# Patient Record
Sex: MALE | Race: WHITE | NOT HISPANIC OR LATINO | URBAN - METROPOLITAN AREA
[De-identification: names, ages, dates, MRNs, and addresses within clinical notes are randomized per-mention and may not be internally consistent; named-entity substitution may affect disease eponyms.]

---

## 2023-03-20 ENCOUNTER — INPATIENT (INPATIENT)
Facility: HOSPITAL | Age: 43
LOS: 1 days | Discharge: HOME | DRG: 392 | End: 2023-03-22
Attending: STUDENT IN AN ORGANIZED HEALTH CARE EDUCATION/TRAINING PROGRAM | Admitting: SURGERY
Payer: COMMERCIAL

## 2023-03-20 VITALS
TEMPERATURE: 99 F | RESPIRATION RATE: 18 BRPM | HEART RATE: 96 BPM | WEIGHT: 179.9 LBS | SYSTOLIC BLOOD PRESSURE: 152 MMHG | DIASTOLIC BLOOD PRESSURE: 79 MMHG | OXYGEN SATURATION: 96 %

## 2023-03-20 DIAGNOSIS — Z98.890 OTHER SPECIFIED POSTPROCEDURAL STATES: Chronic | ICD-10-CM

## 2023-03-20 LAB
ALBUMIN SERPL ELPH-MCNC: 4 G/DL — SIGNIFICANT CHANGE UP (ref 3.3–5)
ALP SERPL-CCNC: 81 U/L — SIGNIFICANT CHANGE UP (ref 40–120)
ALT FLD-CCNC: 22 U/L — SIGNIFICANT CHANGE UP (ref 10–45)
ANION GAP SERPL CALC-SCNC: 13 MMOL/L — SIGNIFICANT CHANGE UP (ref 5–17)
APPEARANCE UR: CLEAR — SIGNIFICANT CHANGE UP
AST SERPL-CCNC: 17 U/L — SIGNIFICANT CHANGE UP (ref 10–40)
BASOPHILS # BLD AUTO: 0.08 K/UL — SIGNIFICANT CHANGE UP (ref 0–0.2)
BASOPHILS NFR BLD AUTO: 0.7 % — SIGNIFICANT CHANGE UP (ref 0–2)
BILIRUB SERPL-MCNC: 0.4 MG/DL — SIGNIFICANT CHANGE UP (ref 0.2–1.2)
BILIRUB UR-MCNC: NEGATIVE — SIGNIFICANT CHANGE UP
BUN SERPL-MCNC: 15 MG/DL — SIGNIFICANT CHANGE UP (ref 7–23)
CALCIUM SERPL-MCNC: 9.9 MG/DL — SIGNIFICANT CHANGE UP (ref 8.4–10.5)
CHLORIDE SERPL-SCNC: 100 MMOL/L — SIGNIFICANT CHANGE UP (ref 96–108)
CO2 SERPL-SCNC: 23 MMOL/L — SIGNIFICANT CHANGE UP (ref 22–31)
COLOR SPEC: YELLOW — SIGNIFICANT CHANGE UP
CREAT SERPL-MCNC: 0.84 MG/DL — SIGNIFICANT CHANGE UP (ref 0.5–1.3)
DIFF PNL FLD: NEGATIVE — SIGNIFICANT CHANGE UP
EGFR: 112 ML/MIN/1.73M2 — SIGNIFICANT CHANGE UP
EOSINOPHIL # BLD AUTO: 0.08 K/UL — SIGNIFICANT CHANGE UP (ref 0–0.5)
EOSINOPHIL NFR BLD AUTO: 0.7 % — SIGNIFICANT CHANGE UP (ref 0–6)
GLUCOSE SERPL-MCNC: 107 MG/DL — HIGH (ref 70–99)
GLUCOSE UR QL: NEGATIVE — SIGNIFICANT CHANGE UP
HCT VFR BLD CALC: 41.6 % — SIGNIFICANT CHANGE UP (ref 39–50)
HGB BLD-MCNC: 14.3 G/DL — SIGNIFICANT CHANGE UP (ref 13–17)
IMM GRANULOCYTES NFR BLD AUTO: 0.3 % — SIGNIFICANT CHANGE UP (ref 0–0.9)
KETONES UR-MCNC: NEGATIVE — SIGNIFICANT CHANGE UP
LEUKOCYTE ESTERASE UR-ACNC: NEGATIVE — SIGNIFICANT CHANGE UP
LIDOCAIN IGE QN: 28 U/L — SIGNIFICANT CHANGE UP (ref 7–60)
LYMPHOCYTES # BLD AUTO: 1.86 K/UL — SIGNIFICANT CHANGE UP (ref 1–3.3)
LYMPHOCYTES # BLD AUTO: 16.8 % — SIGNIFICANT CHANGE UP (ref 13–44)
MCHC RBC-ENTMCNC: 32.1 PG — SIGNIFICANT CHANGE UP (ref 27–34)
MCHC RBC-ENTMCNC: 34.4 GM/DL — SIGNIFICANT CHANGE UP (ref 32–36)
MCV RBC AUTO: 93.3 FL — SIGNIFICANT CHANGE UP (ref 80–100)
MONOCYTES # BLD AUTO: 1.04 K/UL — HIGH (ref 0–0.9)
MONOCYTES NFR BLD AUTO: 9.4 % — SIGNIFICANT CHANGE UP (ref 2–14)
NEUTROPHILS # BLD AUTO: 7.97 K/UL — HIGH (ref 1.8–7.4)
NEUTROPHILS NFR BLD AUTO: 72.1 % — SIGNIFICANT CHANGE UP (ref 43–77)
NITRITE UR-MCNC: NEGATIVE — SIGNIFICANT CHANGE UP
NRBC # BLD: 0 /100 WBCS — SIGNIFICANT CHANGE UP (ref 0–0)
PH UR: 6 — SIGNIFICANT CHANGE UP (ref 5–8)
PLATELET # BLD AUTO: 294 K/UL — SIGNIFICANT CHANGE UP (ref 150–400)
POTASSIUM SERPL-MCNC: 4.2 MMOL/L — SIGNIFICANT CHANGE UP (ref 3.5–5.3)
POTASSIUM SERPL-SCNC: 4.2 MMOL/L — SIGNIFICANT CHANGE UP (ref 3.5–5.3)
PROT SERPL-MCNC: 7.5 G/DL — SIGNIFICANT CHANGE UP (ref 6–8.3)
PROT UR-MCNC: NEGATIVE MG/DL — SIGNIFICANT CHANGE UP
RBC # BLD: 4.46 M/UL — SIGNIFICANT CHANGE UP (ref 4.2–5.8)
RBC # FLD: 11.8 % — SIGNIFICANT CHANGE UP (ref 10.3–14.5)
SARS-COV-2 RNA SPEC QL NAA+PROBE: NEGATIVE — SIGNIFICANT CHANGE UP
SODIUM SERPL-SCNC: 136 MMOL/L — SIGNIFICANT CHANGE UP (ref 135–145)
SP GR SPEC: 1.02 — SIGNIFICANT CHANGE UP (ref 1–1.03)
UROBILINOGEN FLD QL: 0.2 E.U./DL — SIGNIFICANT CHANGE UP
WBC # BLD: 11.06 K/UL — HIGH (ref 3.8–10.5)
WBC # FLD AUTO: 11.06 K/UL — HIGH (ref 3.8–10.5)

## 2023-03-20 PROCEDURE — G1004: CPT

## 2023-03-20 PROCEDURE — 99285 EMERGENCY DEPT VISIT HI MDM: CPT

## 2023-03-20 PROCEDURE — 74177 CT ABD & PELVIS W/CONTRAST: CPT | Mod: 26,MG

## 2023-03-20 RX ORDER — METRONIDAZOLE 500 MG
500 TABLET ORAL EVERY 8 HOURS
Refills: 0 | Status: DISCONTINUED | OUTPATIENT
Start: 2023-03-20 | End: 2023-03-22

## 2023-03-20 RX ORDER — BACITRACIN ZINC 500 UNIT/G
1 OINTMENT IN PACKET (EA) TOPICAL DAILY
Refills: 0 | Status: DISCONTINUED | OUTPATIENT
Start: 2023-03-20 | End: 2023-03-22

## 2023-03-20 RX ORDER — BUPROPION HYDROCHLORIDE 150 MG/1
450 TABLET, EXTENDED RELEASE ORAL DAILY
Refills: 0 | Status: DISCONTINUED | OUTPATIENT
Start: 2023-03-20 | End: 2023-03-22

## 2023-03-20 RX ORDER — KETOROLAC TROMETHAMINE 30 MG/ML
15 SYRINGE (ML) INJECTION ONCE
Refills: 0 | Status: DISCONTINUED | OUTPATIENT
Start: 2023-03-20 | End: 2023-03-20

## 2023-03-20 RX ORDER — GABAPENTIN 400 MG/1
900 CAPSULE ORAL AT BEDTIME
Refills: 0 | Status: DISCONTINUED | OUTPATIENT
Start: 2023-03-20 | End: 2023-03-21

## 2023-03-20 RX ORDER — METRONIDAZOLE 500 MG
500 TABLET ORAL ONCE
Refills: 0 | Status: COMPLETED | OUTPATIENT
Start: 2023-03-20 | End: 2023-03-20

## 2023-03-20 RX ORDER — CEFTRIAXONE 500 MG/1
1000 INJECTION, POWDER, FOR SOLUTION INTRAMUSCULAR; INTRAVENOUS EVERY 24 HOURS
Refills: 0 | Status: DISCONTINUED | OUTPATIENT
Start: 2023-03-21 | End: 2023-03-22

## 2023-03-20 RX ORDER — ACETAMINOPHEN 500 MG
650 TABLET ORAL EVERY 6 HOURS
Refills: 0 | Status: DISCONTINUED | OUTPATIENT
Start: 2023-03-20 | End: 2023-03-22

## 2023-03-20 RX ORDER — CEFTRIAXONE 500 MG/1
1000 INJECTION, POWDER, FOR SOLUTION INTRAMUSCULAR; INTRAVENOUS ONCE
Refills: 0 | Status: DISCONTINUED | OUTPATIENT
Start: 2023-03-20 | End: 2023-03-20

## 2023-03-20 RX ORDER — ONDANSETRON 8 MG/1
4 TABLET, FILM COATED ORAL EVERY 6 HOURS
Refills: 0 | Status: DISCONTINUED | OUTPATIENT
Start: 2023-03-20 | End: 2023-03-22

## 2023-03-20 RX ORDER — CEFTRIAXONE 500 MG/1
1000 INJECTION, POWDER, FOR SOLUTION INTRAMUSCULAR; INTRAVENOUS ONCE
Refills: 0 | Status: COMPLETED | OUTPATIENT
Start: 2023-03-20 | End: 2023-03-20

## 2023-03-20 RX ORDER — DIATRIZOATE MEGLUMINE 180 MG/ML
30 INJECTION, SOLUTION INTRAVESICAL ONCE
Refills: 0 | Status: COMPLETED | OUTPATIENT
Start: 2023-03-20 | End: 2023-03-20

## 2023-03-20 RX ORDER — HEPARIN SODIUM 5000 [USP'U]/ML
5000 INJECTION INTRAVENOUS; SUBCUTANEOUS EVERY 8 HOURS
Refills: 0 | Status: DISCONTINUED | OUTPATIENT
Start: 2023-03-20 | End: 2023-03-22

## 2023-03-20 RX ORDER — GABAPENTIN 400 MG/1
900 CAPSULE ORAL
Qty: 0 | Refills: 0 | DISCHARGE

## 2023-03-20 RX ORDER — GABAPENTIN 400 MG/1
600 CAPSULE ORAL AT BEDTIME
Refills: 0 | Status: DISCONTINUED | OUTPATIENT
Start: 2023-03-20 | End: 2023-03-20

## 2023-03-20 RX ORDER — CEFTRIAXONE 500 MG/1
INJECTION, POWDER, FOR SOLUTION INTRAMUSCULAR; INTRAVENOUS
Refills: 0 | Status: DISCONTINUED | OUTPATIENT
Start: 2023-03-20 | End: 2023-03-20

## 2023-03-20 RX ORDER — GABAPENTIN 400 MG/1
600 CAPSULE ORAL
Qty: 0 | Refills: 0 | DISCHARGE

## 2023-03-20 RX ORDER — BUPROPION HYDROCHLORIDE 150 MG/1
1 TABLET, EXTENDED RELEASE ORAL
Qty: 0 | Refills: 0 | DISCHARGE

## 2023-03-20 RX ORDER — SODIUM CHLORIDE 9 MG/ML
1000 INJECTION, SOLUTION INTRAVENOUS
Refills: 0 | Status: DISCONTINUED | OUTPATIENT
Start: 2023-03-20 | End: 2023-03-22

## 2023-03-20 RX ORDER — PANTOPRAZOLE SODIUM 20 MG/1
40 TABLET, DELAYED RELEASE ORAL DAILY
Refills: 0 | Status: DISCONTINUED | OUTPATIENT
Start: 2023-03-20 | End: 2023-03-22

## 2023-03-20 RX ORDER — SODIUM CHLORIDE 9 MG/ML
1000 INJECTION INTRAMUSCULAR; INTRAVENOUS; SUBCUTANEOUS ONCE
Refills: 0 | Status: COMPLETED | OUTPATIENT
Start: 2023-03-20 | End: 2023-03-20

## 2023-03-20 RX ADMIN — SODIUM CHLORIDE 120 MILLILITER(S): 9 INJECTION, SOLUTION INTRAVENOUS at 21:44

## 2023-03-20 RX ADMIN — Medication 650 MILLIGRAM(S): at 22:02

## 2023-03-20 RX ADMIN — Medication 100 MILLIGRAM(S): at 18:50

## 2023-03-20 RX ADMIN — Medication 1 APPLICATION(S): at 23:30

## 2023-03-20 RX ADMIN — SODIUM CHLORIDE 1000 MILLILITER(S): 9 INJECTION INTRAMUSCULAR; INTRAVENOUS; SUBCUTANEOUS at 16:13

## 2023-03-20 RX ADMIN — DIATRIZOATE MEGLUMINE 30 MILLILITER(S): 180 INJECTION, SOLUTION INTRAVESICAL at 16:12

## 2023-03-20 RX ADMIN — Medication 650 MILLIGRAM(S): at 23:02

## 2023-03-20 RX ADMIN — Medication 15 MILLIGRAM(S): at 16:13

## 2023-03-20 RX ADMIN — HEPARIN SODIUM 5000 UNIT(S): 5000 INJECTION INTRAVENOUS; SUBCUTANEOUS at 23:30

## 2023-03-20 RX ADMIN — CEFTRIAXONE 100 MILLIGRAM(S): 500 INJECTION, POWDER, FOR SOLUTION INTRAMUSCULAR; INTRAVENOUS at 18:28

## 2023-03-20 NOTE — H&P ADULT - HISTORY OF PRESENT ILLNESS
ID: This is a 42-yo male with depression presenting with abdominal pain for four days for whom surgery was consulted for diverticulitis with microperforation on CT scan.     HPI:   He notes mild abdominal pain becoming slightly more severe over the last four days. he initially ascribed the pains to gas as he has not had this type of discomfort previously but they did not resolve. He denies nauesa/vomiting. He is passing gas (slightly reduced amount) and is having loose BMs (typically has 2 daily but has been slightly reduced amount last few days).     He has never had endoscopy/colonoscopy. He has never had abdominal surgery. He has no known familial or personal gastrointestinal illnesses.    - Prior work-up in ED: labs with mild leukocytosis (11), CT shows sigmoid diverticulitis with "microperforation" - small phlegmonous challenges with gas bubbles.  - PMH: depression  - PSH: ankle surgeries for a fracture  - Med: Bupropion (450 daily), Gabapentin (600-900 qhs for sleep)

## 2023-03-20 NOTE — ED PROVIDER NOTE - CLINICAL SUMMARY MEDICAL DECISION MAKING FREE TEXT BOX
43 y/o m hx depression presents c/o lower abd pain x 2 days.  Pt afebrile in ED, labs significant for wbc 11, CT a/p shows diverticulitis with small foci of air consistent with microperforation.  Pt given a dose of ceftriaxone/flagyl, surgery consult obtained, will f/u recs.

## 2023-03-20 NOTE — H&P ADULT - NSHPLABSRESULTS_GEN_ALL_CORE
LABS:                        14.3   11.06 )-----------( 294      ( 20 Mar 2023 15:47 )             41.6     03-20    136  |  100  |  15  ----------------------------<  107<H>  4.2   |  23  |  0.84    Ca    9.9      20 Mar 2023 15:47    TPro  7.5  /  Alb  4.0  /  TBili  0.4  /  DBili  x   /  AST  17  /  ALT  22  /  AlkPhos  81  03-20      Urinalysis Basic - ( 20 Mar 2023 15:47 )    Color: Yellow / Appearance: Clear / S.020 / pH: x  Gluc: x / Ketone: NEGATIVE  / Bili: Negative / Urobili: 0.2 E.U./dL   Blood: x / Protein: NEGATIVE mg/dL / Nitrite: NEGATIVE   Leuk Esterase: NEGATIVE / RBC: x / WBC x   Sq Epi: x / Non Sq Epi: x / Bacteria: x        RADIOLOGY & ADDITIONAL STUDIES:  CT Abd/Pelv:   IMPRESSION:    1. Acute sigmoid diverticulitis with microperforation.

## 2023-03-20 NOTE — ED ADULT NURSE NOTE - OBJECTIVE STATEMENT
Pt PMH neck pain reports pelvic pain since 4 days ago. Pain is described as constant pain which fluctuates in intensity. present severity 6/10. No triggering other alleviating factors. Pt also reports pain w defecation. Last BM, today brown soft stool. Pt also reports having to push to finish urination. No other urinary symptoms. Pt denies any n/v, fever or upper abd pain.

## 2023-03-20 NOTE — H&P ADULT - NSHPREVIEWOFSYSTEMS_GEN_ALL_CORE
REVIEW OF SYSTEMS    General: no F/C  Neuro: No seizures, focal neurologic symptoms  Psych: No mood changes  CV: No CP, SOB  Pulm: No SOB, coughing  GI: No N/V, moderate abodminal pain. No diarrhea.   : No dysuria  Heme: No weakness, easy bruising.  Skin: No rashes  Lymph: No swelling

## 2023-03-20 NOTE — H&P ADULT - NSHPPHYSICALEXAM_GEN_ALL_CORE
Vital Signs Last 24 Hrs  T(C): 37.4 (20 Mar 2023 15:32), Max: 37.4 (20 Mar 2023 15:32)  T(F): 99.3 (20 Mar 2023 15:32), Max: 99.3 (20 Mar 2023 15:32)  HR: 96 (20 Mar 2023 15:32) (96 - 96)  BP: 152/79 (20 Mar 2023 15:32) (152/79 - 152/79)  BP(mean): --  RR: 18 (20 Mar 2023 15:32) (18 - 18)  SpO2: 96% (20 Mar 2023 15:32) (96% - 96%)    Parameters below as of 20 Mar 2023 15:32  Patient On (Oxygen Delivery Method): room air        PHYSICAL EXAMINATION    Gen: Well-appearing 42-yo male in NAD  Neurologic: AAOx4; moving all extremities equally.   CV: Normal rate, regular rhythm  Pulm: Breathing comfortably  Abd: Soft, non-distended; moderate TTP in LLQ without R/G.  : No Vang  Skin: No rashes  Extremities: No edema.  Psychiatric: Interacting normally

## 2023-03-20 NOTE — ED PROVIDER NOTE - NS ED ATTENDING STATEMENT MOD
This was a shared visit with the BIGG. I reviewed and verified the documentation and independently performed the documented:

## 2023-03-20 NOTE — H&P ADULT - ASSESSMENT
This is a 42-yo male with depression presenting with abdominal pain for four days for whom surgery was consulted for diverticulitis with microperforation on CT scan, discussed inpatient observation with IV ABx for Hinchey 1A Complicated Diverticulitis versus outpatient ABx with return precautions, and patient opted for inpatient observation    - Acetaminophen P RN for pain control. Continue home bupropion / gabapentin  - Monitor HD in regional setting. Stable in ED  - NPO with support by IVF  - CTX/Metronidazole  - SQH TID  - LR at 120 CC/hr  - Regional level of care - admitted under Dr. Walsh.

## 2023-03-20 NOTE — ED PROVIDER NOTE - OBJECTIVE STATEMENT
41 y/o m hx depression presents c/o mid to left lower abd pain over the past 2 days.  Pt reports feeling the need to have a bowel movement and not much coming out.  Denies fever, chills, dysuria, testicular pain, n/v, all other ROS negative.

## 2023-03-20 NOTE — ED PROVIDER NOTE - ATTENDING APP SHARED VISIT CONTRIBUTION OF CARE
Attending Statement: I have personally performed a face to face diagnostic evaluation on this patient. I have reviewed the ACP note and agree with the history, exam and plan of care, except as noted.     Attending Contribution to Care:  42M with a PMHx of depression who p/w abdominal pain for four days, +TTP  in LLQ, VSS, plan for labs, pain control and CT. CT shows acute diverticulitis with microperforation, gen surg was consulted and pt was admitted for IV abx and inpatient observation.

## 2023-03-20 NOTE — PATIENT PROFILE ADULT - FALL HARM RISK - UNIVERSAL INTERVENTIONS
Bed in lowest position, wheels locked, appropriate side rails in place/Call bell, personal items and telephone in reach/Instruct patient to call for assistance before getting out of bed or chair/Non-slip footwear when patient is out of bed/[place] to call system/Physically safe environment - no spills, clutter or unnecessary equipment/Purposeful Proactive Rounding/Room/bathroom lighting operational, light cord in reach

## 2023-03-21 ENCOUNTER — TRANSCRIPTION ENCOUNTER (OUTPATIENT)
Age: 43
End: 2023-03-21

## 2023-03-21 LAB
ANION GAP SERPL CALC-SCNC: 8 MMOL/L — SIGNIFICANT CHANGE UP (ref 5–17)
APTT BLD: 33.7 SEC — SIGNIFICANT CHANGE UP (ref 27.5–35.5)
BLD GP AB SCN SERPL QL: NEGATIVE — SIGNIFICANT CHANGE UP
BUN SERPL-MCNC: 12 MG/DL — SIGNIFICANT CHANGE UP (ref 7–23)
CALCIUM SERPL-MCNC: 9.2 MG/DL — SIGNIFICANT CHANGE UP (ref 8.4–10.5)
CHLORIDE SERPL-SCNC: 103 MMOL/L — SIGNIFICANT CHANGE UP (ref 96–108)
CO2 SERPL-SCNC: 27 MMOL/L — SIGNIFICANT CHANGE UP (ref 22–31)
CREAT SERPL-MCNC: 0.87 MG/DL — SIGNIFICANT CHANGE UP (ref 0.5–1.3)
EGFR: 110 ML/MIN/1.73M2 — SIGNIFICANT CHANGE UP
GLUCOSE SERPL-MCNC: 100 MG/DL — HIGH (ref 70–99)
HCT VFR BLD CALC: 38.3 % — LOW (ref 39–50)
HGB BLD-MCNC: 13 G/DL — SIGNIFICANT CHANGE UP (ref 13–17)
INR BLD: 1.19 — HIGH (ref 0.88–1.16)
MAGNESIUM SERPL-MCNC: 2 MG/DL — SIGNIFICANT CHANGE UP (ref 1.6–2.6)
MCHC RBC-ENTMCNC: 31.6 PG — SIGNIFICANT CHANGE UP (ref 27–34)
MCHC RBC-ENTMCNC: 33.9 GM/DL — SIGNIFICANT CHANGE UP (ref 32–36)
MCV RBC AUTO: 93 FL — SIGNIFICANT CHANGE UP (ref 80–100)
NRBC # BLD: 0 /100 WBCS — SIGNIFICANT CHANGE UP (ref 0–0)
PHOSPHATE SERPL-MCNC: 3.3 MG/DL — SIGNIFICANT CHANGE UP (ref 2.5–4.5)
PLATELET # BLD AUTO: 252 K/UL — SIGNIFICANT CHANGE UP (ref 150–400)
POTASSIUM SERPL-MCNC: 3.8 MMOL/L — SIGNIFICANT CHANGE UP (ref 3.5–5.3)
POTASSIUM SERPL-SCNC: 3.8 MMOL/L — SIGNIFICANT CHANGE UP (ref 3.5–5.3)
PROTHROM AB SERPL-ACNC: 14.2 SEC — HIGH (ref 10.5–13.4)
RBC # BLD: 4.12 M/UL — LOW (ref 4.2–5.8)
RBC # FLD: 11.8 % — SIGNIFICANT CHANGE UP (ref 10.3–14.5)
RH IG SCN BLD-IMP: POSITIVE — SIGNIFICANT CHANGE UP
SODIUM SERPL-SCNC: 138 MMOL/L — SIGNIFICANT CHANGE UP (ref 135–145)
WBC # BLD: 4.74 K/UL — SIGNIFICANT CHANGE UP (ref 3.8–10.5)
WBC # FLD AUTO: 4.74 K/UL — SIGNIFICANT CHANGE UP (ref 3.8–10.5)

## 2023-03-21 PROCEDURE — 99254 IP/OBS CNSLTJ NEW/EST MOD 60: CPT

## 2023-03-21 PROCEDURE — 99231 SBSQ HOSP IP/OBS SF/LOW 25: CPT

## 2023-03-21 RX ORDER — POTASSIUM CHLORIDE 20 MEQ
10 PACKET (EA) ORAL ONCE
Refills: 0 | Status: COMPLETED | OUTPATIENT
Start: 2023-03-21 | End: 2023-03-21

## 2023-03-21 RX ORDER — GABAPENTIN 400 MG/1
900 CAPSULE ORAL AT BEDTIME
Refills: 0 | Status: DISCONTINUED | OUTPATIENT
Start: 2023-03-21 | End: 2023-03-22

## 2023-03-21 RX ORDER — KETOROLAC TROMETHAMINE 30 MG/ML
10 SYRINGE (ML) INJECTION ONCE
Refills: 0 | Status: DISCONTINUED | OUTPATIENT
Start: 2023-03-21 | End: 2023-03-21

## 2023-03-21 RX ORDER — POTASSIUM CHLORIDE 20 MEQ
20 PACKET (EA) ORAL ONCE
Refills: 0 | Status: COMPLETED | OUTPATIENT
Start: 2023-03-21 | End: 2023-03-21

## 2023-03-21 RX ADMIN — GABAPENTIN 900 MILLIGRAM(S): 400 CAPSULE ORAL at 23:19

## 2023-03-21 RX ADMIN — Medication 100 MILLIEQUIVALENT(S): at 11:31

## 2023-03-21 RX ADMIN — HEPARIN SODIUM 5000 UNIT(S): 5000 INJECTION INTRAVENOUS; SUBCUTANEOUS at 23:20

## 2023-03-21 RX ADMIN — Medication 10 MILLIGRAM(S): at 22:15

## 2023-03-21 RX ADMIN — HEPARIN SODIUM 5000 UNIT(S): 5000 INJECTION INTRAVENOUS; SUBCUTANEOUS at 15:40

## 2023-03-21 RX ADMIN — Medication 650 MILLIGRAM(S): at 19:30

## 2023-03-21 RX ADMIN — Medication 20 MILLIEQUIVALENT(S): at 10:24

## 2023-03-21 RX ADMIN — GABAPENTIN 900 MILLIGRAM(S): 400 CAPSULE ORAL at 00:00

## 2023-03-21 RX ADMIN — Medication 100 MILLIGRAM(S): at 17:09

## 2023-03-21 RX ADMIN — Medication 10 MILLIGRAM(S): at 21:15

## 2023-03-21 RX ADMIN — Medication 650 MILLIGRAM(S): at 18:30

## 2023-03-21 RX ADMIN — Medication 1 APPLICATION(S): at 11:31

## 2023-03-21 RX ADMIN — Medication 100 MILLIGRAM(S): at 10:24

## 2023-03-21 RX ADMIN — PANTOPRAZOLE SODIUM 40 MILLIGRAM(S): 20 TABLET, DELAYED RELEASE ORAL at 11:31

## 2023-03-21 RX ADMIN — Medication 100 MILLIGRAM(S): at 02:41

## 2023-03-21 RX ADMIN — HEPARIN SODIUM 5000 UNIT(S): 5000 INJECTION INTRAVENOUS; SUBCUTANEOUS at 07:06

## 2023-03-21 RX ADMIN — BUPROPION HYDROCHLORIDE 450 MILLIGRAM(S): 150 TABLET, EXTENDED RELEASE ORAL at 10:42

## 2023-03-21 RX ADMIN — CEFTRIAXONE 100 MILLIGRAM(S): 500 INJECTION, POWDER, FOR SOLUTION INTRAMUSCULAR; INTRAVENOUS at 18:20

## 2023-03-21 NOTE — DISCHARGE NOTE PROVIDER - CARE PROVIDER_API CALL
Avila Almodovar  [address] [place] [address] Ave  [address]  [place] [address]  Phone: [phone]  Fax: [phone]  Follow Up Time:

## 2023-03-21 NOTE — DISCHARGE NOTE PROVIDER - PROVIDER TOKENS
FREE:[LAST:[Scooby],FIRST:[Avila],PHONE:[phone]],FAX:[phone]],ADDRESS:[[address] [place] [address]]]

## 2023-03-21 NOTE — DISCHARGE NOTE PROVIDER - NSDCFUADDINST_GEN_ALL_CORE_FT
Please follow up with Dr. Almodovar in one week; you may call the office to make an appointment at your earliest convenience.  You have been prescribed oral antibiotics. Please be sure to complete the entire course as directed.    General Discharge Instructions:  Please resume all regular home medications unless specifically advised not to take a particular medication. Also, please take any new medications as prescribed.  Please get plenty of rest, continue to ambulate several times per day, and drink adequate amounts of fluids.   Please follow-up with your surgeon and Primary Care Provider (PCP) in 1-2 weeks.    Warning Signs:  Please call your doctor if you experience the following:  *You experience new chest pain, pressure, squeezing or tightness.  *New or worsening cough, shortness of breath, or wheeze.  *If you are vomiting and cannot keep down fluids or your medications.  *You are getting dehydrated due to continued vomiting, diarrhea, or other reasons. Signs of dehydration include dry mouth, rapid heartbeat, or feeling dizzy or faint when standing.  *You see blood or dark/black material when you vomit or have a bowel movement.  *You experience burning when you urinate, have blood in your urine, or experience a discharge.  *Your pain is not improving within 8-12 hours or is not gone within 24 hours. Call or return immediately if your pain is getting worse, changes location, or moves to your chest or back.  *You have shaking chills, or fever greater than 101.5 degrees Fahrenheit or 38 degrees Celsius.  *Any change in your symptoms, or any new symptoms that concern you.   Please follow up with Dr. Almodovar in 1-2 weeks; you may call the office to make an appointment at your earliest convenience.  You have been prescribed oral antibiotics. Please be sure to complete the entire course as directed.    General Discharge Instructions:  Please resume all regular home medications unless specifically advised not to take a particular medication. Also, please take any new medications as prescribed.  Please get plenty of rest, continue to ambulate several times per day, and drink adequate amounts of fluids.   Please follow-up with your surgeon and Primary Care Provider (PCP) in 1-2 weeks.    Warning Signs:  Please call your doctor if you experience the following:  *You experience new chest pain, pressure, squeezing or tightness.  *New or worsening cough, shortness of breath, or wheeze.  *If you are vomiting and cannot keep down fluids or your medications.  *You are getting dehydrated due to continued vomiting, diarrhea, or other reasons. Signs of dehydration include dry mouth, rapid heartbeat, or feeling dizzy or faint when standing.  *You see blood or dark/black material when you vomit or have a bowel movement.  *You experience burning when you urinate, have blood in your urine, or experience a discharge.  *Your pain is not improving within 8-12 hours or is not gone within 24 hours. Call or return immediately if your pain is getting worse, changes location, or moves to your chest or back.  *You have shaking chills, or fever greater than 101.5 degrees Fahrenheit or 38 degrees Celsius.  *Any change in your symptoms, or any new symptoms that concern you.

## 2023-03-21 NOTE — PROGRESS NOTE ADULT - SUBJECTIVE AND OBJECTIVE BOX
SUBJECTIVE: Patient seen and examined bedside by chief resident. still has some abdominal pain on left side of abdomen, improved since admission. denies nausea or vomiting     cefTRIAXone   IVPB 1000 milliGRAM(s) IV Intermittent every 24 hours  heparin   Injectable 5000 Unit(s) SubCutaneous every 8 hours  metroNIDAZOLE  IVPB 500 milliGRAM(s) IV Intermittent every 8 hours      Vital Signs Last 24 Hrs  T(C): 36.5 (21 Mar 2023 04:26), Max: 37.4 (20 Mar 2023 15:32)  T(F): 97.7 (21 Mar 2023 04:26), Max: 99.4 (20 Mar 2023 20:10)  HR: 72 (21 Mar 2023 04:26) (72 - 97)  BP: 102/66 (21 Mar 2023 04:26) (102/66 - 156/80)  BP(mean): --  RR: 18 (21 Mar 2023 04:26) (17 - 18)  SpO2: 95% (21 Mar 2023 04:26) (95% - 97%)    Parameters below as of 21 Mar 2023 04:26  Patient On (Oxygen Delivery Method): room air      I&O's Detail    20 Mar 2023 07:01  -  21 Mar 2023 07:00  --------------------------------------------------------  IN:    IV PiggyBack: 100 mL    Lactated Ringers: 1080 mL  Total IN: 1180 mL    OUT:    Voided (mL): 350 mL  Total OUT: 350 mL    Total NET: 830 mL          General: NAD, resting comfortably in bed  C/V: NSR  Pulm: Nonlabored breathing, no respiratory distress  Abd: soft, ND, LLQ TTP   Extrem: WWP, no edema, SCDs in place        LABS:                        14.3   11.06 )-----------( 294      ( 20 Mar 2023 15:47 )             41.6     03-20    136  |  100  |  15  ----------------------------<  107<H>  4.2   |  23  |  0.84    Ca    9.9      20 Mar 2023 15:47    TPro  7.5  /  Alb  4.0  /  TBili  0.4  /  DBili  x   /  AST  17  /  ALT  22  /  AlkPhos  81  03-20      Urinalysis Basic - ( 20 Mar 2023 15:47 )    Color: Yellow / Appearance: Clear / S.020 / pH: x  Gluc: x / Ketone: NEGATIVE  / Bili: Negative / Urobili: 0.2 E.U./dL   Blood: x / Protein: NEGATIVE mg/dL / Nitrite: NEGATIVE   Leuk Esterase: NEGATIVE / RBC: x / WBC x   Sq Epi: x / Non Sq Epi: x / Bacteria: x        RADIOLOGY & ADDITIONAL STUDIES:   SUBJECTIVE: Patient seen and examined bedside by chief resident. still has some abdominal pain on left side of abdomen, pain has not improved since admission. denies nausea or vomiting     cefTRIAXone   IVPB 1000 milliGRAM(s) IV Intermittent every 24 hours  heparin   Injectable 5000 Unit(s) SubCutaneous every 8 hours  metroNIDAZOLE  IVPB 500 milliGRAM(s) IV Intermittent every 8 hours      Vital Signs Last 24 Hrs  T(C): 36.5 (21 Mar 2023 04:26), Max: 37.4 (20 Mar 2023 15:32)  T(F): 97.7 (21 Mar 2023 04:26), Max: 99.4 (20 Mar 2023 20:10)  HR: 72 (21 Mar 2023 04:26) (72 - 97)  BP: 102/66 (21 Mar 2023 04:26) (102/66 - 156/80)  BP(mean): --  RR: 18 (21 Mar 2023 04:26) (17 - 18)  SpO2: 95% (21 Mar 2023 04:26) (95% - 97%)    Parameters below as of 21 Mar 2023 04:26  Patient On (Oxygen Delivery Method): room air      I&O's Detail    20 Mar 2023 07:01  -  21 Mar 2023 07:00  --------------------------------------------------------  IN:    IV PiggyBack: 100 mL    Lactated Ringers: 1080 mL  Total IN: 1180 mL    OUT:    Voided (mL): 350 mL  Total OUT: 350 mL    Total NET: 830 mL          General: NAD, resting comfortably in bed  C/V: NSR  Pulm: Nonlabored breathing, no respiratory distress  Abd: soft, ND, LLQ TTP   Extrem: WWP, no edema, SCDs in place        LABS:                        14.3   11.06 )-----------( 294      ( 20 Mar 2023 15:47 )             41.6     03-20    136  |  100  |  15  ----------------------------<  107<H>  4.2   |  23  |  0.84    Ca    9.9      20 Mar 2023 15:47    TPro  7.5  /  Alb  4.0  /  TBili  0.4  /  DBili  x   /  AST  17  /  ALT  22  /  AlkPhos  81  03-20      Urinalysis Basic - ( 20 Mar 2023 15:47 )    Color: Yellow / Appearance: Clear / S.020 / pH: x  Gluc: x / Ketone: NEGATIVE  / Bili: Negative / Urobili: 0.2 E.U./dL   Blood: x / Protein: NEGATIVE mg/dL / Nitrite: NEGATIVE   Leuk Esterase: NEGATIVE / RBC: x / WBC x   Sq Epi: x / Non Sq Epi: x / Bacteria: x        RADIOLOGY & ADDITIONAL STUDIES:

## 2023-03-21 NOTE — CONSULT NOTE ADULT - ASSESSMENT
42 year old M w PMHx of depression, HLD presenting with abd pain x 4 days, CT consistent with microperforated diverticulitis admitted for Hinchey 1A diverticulitis     # Acute sigmoid diverticulitis with microperforation  On bs abx w rocephin / flagyl   NPO- > CLD- advance diet as tolerated   pain control as tolerated     #HLD   not on any medication - controlled w diet and exercise     #depression   continue with home medications    #DVT PPX hsq

## 2023-03-21 NOTE — DISCHARGE NOTE PROVIDER - NSDCMRMEDTOKEN_GEN_ALL_CORE_FT
buPROPion 450 mg/24 hours (XL) oral tablet, extended release: 1 tab(s) orally every 24 hours  gabapentin 600 mg oral tablet: 900 milligram(s) orally once a day (at bedtime)   buPROPion 450 mg/24 hours (XL) oral tablet, extended release: 1 tab(s) orally every 24 hours  cefpodoxime 200 mg oral tablet: 1 tab(s) orally every 12 hours   gabapentin 600 mg oral tablet: 900 milligram(s) orally once a day (at bedtime)  metroNIDAZOLE 500 mg oral tablet: 1 tab(s) orally 3 times a day

## 2023-03-21 NOTE — CONSULT NOTE ADULT - SUBJECTIVE AND OBJECTIVE BOX
Patient is a 42y old  Male who presents with a chief complaint of     HPI:  ID: This is a 42-yo male with depression presenting with abdominal pain for four days for whom surgery was consulted for diverticulitis with microperforation on CT scan.     HPI:   He notes mild abdominal pain becoming slightly more severe over the last four days. he initially ascribed the pains to gas as he has not had this type of discomfort previously but they did not resolve. He denies nauesa/vomiting. He is passing gas (slightly reduced amount) and is having loose BMs (typically has 2 daily but has been slightly reduced amount last few days).     He has never had endoscopy/colonoscopy. He has never had abdominal surgery. He has no known familial or personal gastrointestinal illnesses.    - Prior work-up in ED: labs with mild leukocytosis (11), CT shows sigmoid diverticulitis with "microperforation" - small phlegmonous challenges with gas bubbles.  - PMH: depression  - PSH: ankle surgeries for a fracture  - Med: Bupropion (450 daily), Gabapentin (600-900 qhs for sleep)     (20 Mar 2023 21:18)      Allergies    No Known Allergies    Intolerances        MEDICATIONS  (STANDING):  bacitracin   Ointment 1 Application(s) Topical daily  buPROPion XL (24-Hour) . 450 milliGRAM(s) Oral daily  cefTRIAXone   IVPB 1000 milliGRAM(s) IV Intermittent every 24 hours  heparin   Injectable 5000 Unit(s) SubCutaneous every 8 hours  lactated ringers. 1000 milliLiter(s) (120 mL/Hr) IV Continuous <Continuous>  metroNIDAZOLE  IVPB 500 milliGRAM(s) IV Intermittent every 8 hours  pantoprazole  Injectable 40 milliGRAM(s) IV Push daily  potassium chloride    Tablet ER 20 milliEquivalent(s) Oral once  potassium chloride  10 mEq/100 mL IVPB 10 milliEquivalent(s) IV Intermittent once    MEDICATIONS  (PRN):  acetaminophen     Tablet .. 650 milliGRAM(s) Oral every 6 hours PRN Temp greater or equal to 38C (100.4F), Mild Pain (1 - 3)  gabapentin 900 milliGRAM(s) Oral at bedtime PRN [place]  ondansetron Injectable 4 milliGRAM(s) IV Push every 6 hours PRN Nausea      Daily Height in cm: 175.26 (20 Mar 2023 21:30)    Daily     Drug Dosing Weight  Height (cm): 175.3 (20 Mar 2023 21:30)  Weight (kg): 81.6 (20 Mar 2023 15:32)  BMI (kg/m2): 26.6 (20 Mar 2023 21:30)  BSA (m2): 1.98 (20 Mar 2023 21:30)    PAST MEDICAL & SURGICAL HISTORY:  Major depression      History of ankle surgery          FAMILY HISTORY:        REVIEW OF SYSTEMS:    CONSTITUTIONAL: No fever, weight loss, or fatigue  EYES: No eye pain, visual disturbances, or discharge  ENMT:  No difficulty hearing, tinnitus, vertigo; No sinus or throat pain  NECK: No pain or stiffness  RESPIRATORY: No cough, wheezing, chills or hemoptysis; No shortness of breath  CARDIOVASCULAR: No chest pain, palpitations, dizziness, or leg swelling  GASTROINTESTINAL:+ pain. No nausea, vomiting, or hematemesis; No diarrhea or constipation. No melena or hematochezia.  GENITOURINARY: No dysuria, frequency, hematuria, or incontinence  LYMPH NODES: No enlarged glands  ENDOCRINE: No heat or cold intolerance; No hair loss  MUSCULOSKELETAL: No joint pain or swelling; No muscle, back, or extremity pain  NEUROLOGICAL: No headaches, memory loss, loss of strength, numbness, or tremors  SKIN: No itching, burning, rashes, or lesion          Vital Signs Last 24 Hrs  T(C): 36.8 (21 Mar 2023 08:18), Max: 37.4 (20 Mar 2023 15:32)  T(F): 98.2 (21 Mar 2023 08:18), Max: 99.4 (20 Mar 2023 20:10)  HR: 79 (21 Mar 2023 08:18) (72 - 97)  BP: 117/70 (21 Mar 2023 08:18) (102/66 - 156/80)  BP(mean): --  ABP: --  ABP(mean): --  RR: 16 (21 Mar 2023 08:18) (16 - 18)  SpO2: 98% (21 Mar 2023 08:18) (95% - 98%)    O2 Parameters below as of 21 Mar 2023 08:18  Patient On (Oxygen Delivery Method): room air                I&O's Detail    20 Mar 2023 07:01  -  21 Mar 2023 07:00  --------------------------------------------------------  IN:    IV PiggyBack: 100 mL    Lactated Ringers: 1080 mL  Total IN: 1180 mL    OUT:    Voided (mL): 350 mL  Total OUT: 350 mL    Total NET: 830 mL          PHYSICAL EXAM:    GENERAL: NAD, well-groomed, well-developed  HEAD:  Atraumatic, Normocephalic  EYES: EOMI, PERRLA, conjunctiva and sclera clear  ENMT: No tonsillar erythema, exudates, or enlargement; Moist mucous membranes, Good dentition, No lesions  NECK: Supple, No JVD, Normal thyroid  NERVOUS SYSTEM:  Alert & Oriented X3, Good concentration; Motor Strength 5/5 B/L upper and lower extremities; DTRs 2+ intact and symmetric  CHEST/LUNG: Clear to percussion bilaterally; No rales, rhonchi, wheezing, or rubs  HEART: Regular rate and rhythm; No murmurs, rubs, or gallops  ABDOMEN: Soft, TTP LLQ, RLQ , Nondistended; Bowel sounds present  EXTREMITIES:  2+ Peripheral Pulses, No clubbing, cyanosis, or edema  LYMPH: No lymphadenopathy noted  SKIN: No rashes or lesions    LABS:  CBC Full  -  ( 21 Mar 2023 05:30 )  WBC Count : 4.74 K/uL  RBC Count : 4.12 M/uL  Hemoglobin : 13.0 g/dL  Hematocrit : 38.3 %  Platelet Count - Automated : 252 K/uL  Mean Cell Volume : 93.0 fl  Mean Cell Hemoglobin : 31.6 pg  Mean Cell Hemoglobin Concentration : 33.9 gm/dL  Auto Neutrophil # : x  Auto Lymphocyte # : x  Auto Monocyte # : x  Auto Eosinophil # : x  Auto Basophil # : x  Auto Neutrophil % : x  Auto Lymphocyte % : x  Auto Monocyte % : x  Auto Eosinophil % : x  Auto Basophil % : x    03-21    138  |  103  |  12  ----------------------------<  100<H>  3.8   |  27  |  0.87    Ca    9.2      21 Mar 2023 05:30  Phos  3.3     03-21  Mg     2.0     03-21    TPro  7.5  /  Alb  4.0  /  TBili  0.4  /  DBili  x   /  AST  17  /  ALT  22  /  AlkPhos  81  03-20    CAPILLARY BLOOD GLUCOSE        PT/INR - ( 21 Mar 2023 05:30 )   PT: 14.2 sec;   INR: 1.19          PTT - ( 21 Mar 2023 05:30 )  PTT:33.7 sec  Urinalysis Basic - ( 20 Mar 2023 15:47 )    Color: Yellow / Appearance: Clear / S.020 / pH: x  Gluc: x / Ketone: NEGATIVE  / Bili: Negative / Urobili: 0.2 E.U./dL   Blood: x / Protein: NEGATIVE mg/dL / Nitrite: NEGATIVE   Leuk Esterase: NEGATIVE / RBC: x / WBC x   Sq Epi: x / Non Sq Epi: x / Bacteria: x              EKG:    ECHO, US:    RADIOLOGY: < from: CT Abdomen and Pelvis w/ Oral Cont and w/ IV Cont (23 @ 17:47) >  BONES: Within normal limits.    IMPRESSION:    1. Acutesigmoid diverticulitis with microperforation.    < end of copied text >

## 2023-03-21 NOTE — DISCHARGE NOTE PROVIDER - HOSPITAL COURSE
42M with pmhx depression presenting with abdominal pain for four days. In the ED, WBC 11, CT demonstrated diverticulitis with microperforation. Pt was admitted to team 4 surgery for IV abx and bowel rest. Pt improved clinically, WBC normalized, pain resolved and pts diet was advanced/tolerated. On day of discharge pt was stable for dc home with PO abx.

## 2023-03-22 ENCOUNTER — TRANSCRIPTION ENCOUNTER (OUTPATIENT)
Age: 43
End: 2023-03-22

## 2023-03-22 VITALS
TEMPERATURE: 98 F | RESPIRATION RATE: 17 BRPM | SYSTOLIC BLOOD PRESSURE: 131 MMHG | OXYGEN SATURATION: 97 % | DIASTOLIC BLOOD PRESSURE: 81 MMHG | HEART RATE: 68 BPM

## 2023-03-22 LAB
ANION GAP SERPL CALC-SCNC: 9 MMOL/L — SIGNIFICANT CHANGE UP (ref 5–17)
BUN SERPL-MCNC: 5 MG/DL — LOW (ref 7–23)
CALCIUM SERPL-MCNC: 9.8 MG/DL — SIGNIFICANT CHANGE UP (ref 8.4–10.5)
CHLORIDE SERPL-SCNC: 101 MMOL/L — SIGNIFICANT CHANGE UP (ref 96–108)
CO2 SERPL-SCNC: 29 MMOL/L — SIGNIFICANT CHANGE UP (ref 22–31)
CREAT SERPL-MCNC: 0.81 MG/DL — SIGNIFICANT CHANGE UP (ref 0.5–1.3)
EGFR: 113 ML/MIN/1.73M2 — SIGNIFICANT CHANGE UP
GLUCOSE SERPL-MCNC: 99 MG/DL — SIGNIFICANT CHANGE UP (ref 70–99)
HCT VFR BLD CALC: 41.8 % — SIGNIFICANT CHANGE UP (ref 39–50)
HGB BLD-MCNC: 14 G/DL — SIGNIFICANT CHANGE UP (ref 13–17)
MAGNESIUM SERPL-MCNC: 1.8 MG/DL — SIGNIFICANT CHANGE UP (ref 1.6–2.6)
MCHC RBC-ENTMCNC: 31.5 PG — SIGNIFICANT CHANGE UP (ref 27–34)
MCHC RBC-ENTMCNC: 33.5 GM/DL — SIGNIFICANT CHANGE UP (ref 32–36)
MCV RBC AUTO: 94.1 FL — SIGNIFICANT CHANGE UP (ref 80–100)
NRBC # BLD: 0 /100 WBCS — SIGNIFICANT CHANGE UP (ref 0–0)
PHOSPHATE SERPL-MCNC: 3.2 MG/DL — SIGNIFICANT CHANGE UP (ref 2.5–4.5)
PLATELET # BLD AUTO: 292 K/UL — SIGNIFICANT CHANGE UP (ref 150–400)
POTASSIUM SERPL-MCNC: 4.1 MMOL/L — SIGNIFICANT CHANGE UP (ref 3.5–5.3)
POTASSIUM SERPL-SCNC: 4.1 MMOL/L — SIGNIFICANT CHANGE UP (ref 3.5–5.3)
RBC # BLD: 4.44 M/UL — SIGNIFICANT CHANGE UP (ref 4.2–5.8)
RBC # FLD: 11.9 % — SIGNIFICANT CHANGE UP (ref 10.3–14.5)
SODIUM SERPL-SCNC: 139 MMOL/L — SIGNIFICANT CHANGE UP (ref 135–145)
WBC # BLD: 4.71 K/UL — SIGNIFICANT CHANGE UP (ref 3.8–10.5)
WBC # FLD AUTO: 4.71 K/UL — SIGNIFICANT CHANGE UP (ref 3.8–10.5)

## 2023-03-22 PROCEDURE — 85610 PROTHROMBIN TIME: CPT

## 2023-03-22 PROCEDURE — 81003 URINALYSIS AUTO W/O SCOPE: CPT

## 2023-03-22 PROCEDURE — 36415 COLL VENOUS BLD VENIPUNCTURE: CPT

## 2023-03-22 PROCEDURE — 84100 ASSAY OF PHOSPHORUS: CPT

## 2023-03-22 PROCEDURE — 80048 BASIC METABOLIC PNL TOTAL CA: CPT

## 2023-03-22 PROCEDURE — 86900 BLOOD TYPING SEROLOGIC ABO: CPT

## 2023-03-22 PROCEDURE — 86901 BLOOD TYPING SEROLOGIC RH(D): CPT

## 2023-03-22 PROCEDURE — 83735 ASSAY OF MAGNESIUM: CPT

## 2023-03-22 PROCEDURE — 85730 THROMBOPLASTIN TIME PARTIAL: CPT

## 2023-03-22 PROCEDURE — 74177 CT ABD & PELVIS W/CONTRAST: CPT | Mod: MG

## 2023-03-22 PROCEDURE — 86850 RBC ANTIBODY SCREEN: CPT

## 2023-03-22 PROCEDURE — 99285 EMERGENCY DEPT VISIT HI MDM: CPT

## 2023-03-22 PROCEDURE — 80053 COMPREHEN METABOLIC PANEL: CPT

## 2023-03-22 PROCEDURE — 85025 COMPLETE CBC W/AUTO DIFF WBC: CPT

## 2023-03-22 PROCEDURE — 96375 TX/PRO/DX INJ NEW DRUG ADDON: CPT

## 2023-03-22 PROCEDURE — 87635 SARS-COV-2 COVID-19 AMP PRB: CPT

## 2023-03-22 PROCEDURE — 96374 THER/PROPH/DIAG INJ IV PUSH: CPT

## 2023-03-22 PROCEDURE — G1004: CPT

## 2023-03-22 PROCEDURE — 85027 COMPLETE CBC AUTOMATED: CPT

## 2023-03-22 PROCEDURE — 83690 ASSAY OF LIPASE: CPT

## 2023-03-22 RX ORDER — MAGNESIUM SULFATE 500 MG/ML
1 VIAL (ML) INJECTION ONCE
Refills: 0 | Status: COMPLETED | OUTPATIENT
Start: 2023-03-22 | End: 2023-03-22

## 2023-03-22 RX ORDER — METRONIDAZOLE 500 MG
1 TABLET ORAL
Qty: 24 | Refills: 0
Start: 2023-03-22 | End: 2023-03-29

## 2023-03-22 RX ORDER — CEFPODOXIME PROXETIL 100 MG
1 TABLET ORAL
Qty: 16 | Refills: 0
Start: 2023-03-22 | End: 2023-03-29

## 2023-03-22 RX ADMIN — Medication 100 MILLIGRAM(S): at 01:00

## 2023-03-22 RX ADMIN — HEPARIN SODIUM 5000 UNIT(S): 5000 INJECTION INTRAVENOUS; SUBCUTANEOUS at 07:12

## 2023-03-22 RX ADMIN — BUPROPION HYDROCHLORIDE 450 MILLIGRAM(S): 150 TABLET, EXTENDED RELEASE ORAL at 09:09

## 2023-03-22 RX ADMIN — Medication 100 GRAM(S): at 09:09

## 2023-03-22 RX ADMIN — PANTOPRAZOLE SODIUM 40 MILLIGRAM(S): 20 TABLET, DELAYED RELEASE ORAL at 12:13

## 2023-03-22 RX ADMIN — Medication 100 MILLIGRAM(S): at 10:40

## 2023-03-22 RX ADMIN — Medication 1 APPLICATION(S): at 12:12

## 2023-03-22 NOTE — PROGRESS NOTE ADULT - ASSESSMENT
42 year old M w PMHx of depression presenting with abd pain x 4 days, CT consistent with microperforated diverticulitis admitted for Hinchey 1A diverticulitis now on IV Abx.     Regular diet  CTX/Flagyl  Pain/nausea control prn, limit narcotics  Home buproprion / gabapentin  SQH/SCDs/OOBA/IS  AM Labs  
42 year old M w PMHx of depression presenting with abd pain x 4 days, CT consistent with microperforated diverticulitis admitted for Hinchey 1A diverticulitis now on IV Abx.     ALIVIA  NPO/IVF  CTX/Flagyl  Pain/nausea control prn, limit narcotics  Home buproprion / gabapentin  SQH/SCDs/OOBA/IS  AM Labs

## 2023-03-22 NOTE — DISCHARGE NOTE NURSING/CASE MANAGEMENT/SOCIAL WORK - PATIENT PORTAL LINK FT
You can access the FollowMyHealth Patient Portal offered by [facility] by registering at the following website: [email or web address]. By joining [email or web address] FollowMyHealth portal, you will also be able to view your health information using other applications (apps) compatible with our system.

## 2023-03-22 NOTE — DISCHARGE NOTE NURSING/CASE MANAGEMENT/SOCIAL WORK - NSDCPEFALRISK_GEN_ALL_CORE
For information on Fall & Injury Prevention, visit: [email or web address] OR  [email or web address] OR  https://www.cdc.gov/steadi/patient.html

## 2023-03-22 NOTE — PROGRESS NOTE ADULT - SUBJECTIVE AND OBJECTIVE BOX
SUBJECTIVE: Pt seen and examined by chief resident. Pt is doing well, resting comfortably on bed. Pain controlled. Clear liquid diet tolerated. Ambulating out of bed. +F/+BM. No nausea or vomiting. No complaints at this time.    Vital Signs Last 24 Hrs  T(C): 36.4 (22 Mar 2023 05:34), Max: 36.8 (21 Mar 2023 08:18)  T(F): 97.5 (22 Mar 2023 05:34), Max: 98.2 (21 Mar 2023 08:18)  HR: 71 (22 Mar 2023 05:34) (66 - 80)  BP: 119/69 (22 Mar 2023 05:34) (107/63 - 122/76)  BP(mean): --  RR: 18 (22 Mar 2023 05:34) (16 - 18)  SpO2: 95% (22 Mar 2023 05:34) (94% - 99%)    Parameters below as of 22 Mar 2023 05:34  Patient On (Oxygen Delivery Method): room air        I&O's Summary    21 Mar 2023 07:01  -  22 Mar 2023 07:00  --------------------------------------------------------  IN: 2880 mL / OUT: 2650 mL / NET: 230 mL        Physical Exam:  General Appearance: Appears well, NAD  Pulmonary: Nonlabored breathing, no respiratory distress  Abdomen: Soft, mildly tender in the lower abdomen, nondistended  Extremities: WWP, SCD's in place     LABS:                        13.0   4.74  )-----------( 252      ( 21 Mar 2023 05:30 )             38.3     03-21    138  |  103  |  12  ----------------------------<  100<H>  3.8   |  27  |  0.87    Ca    9.2      21 Mar 2023 05:30  Phos  3.3     03-21  Mg     2.0     03-21    TPro  7.5  /  Alb  4.0  /  TBili  0.4  /  DBili  x   /  AST  17  /  ALT  22  /  AlkPhos  81  03-20    PT/INR - ( 21 Mar 2023 05:30 )   PT: 14.2 sec;   INR: 1.19          PTT - ( 21 Mar 2023 05:30 )  PTT:33.7 sec  Urinalysis Basic - ( 20 Mar 2023 15:47 )    Color: Yellow / Appearance: Clear / S.020 / pH: x  Gluc: x / Ketone: NEGATIVE  / Bili: Negative / Urobili: 0.2 E.U./dL   Blood: x / Protein: NEGATIVE mg/dL / Nitrite: NEGATIVE   Leuk Esterase: NEGATIVE / RBC: x / WBC x   Sq Epi: x / Non Sq Epi: x / Bacteria: x

## 2023-03-26 PROBLEM — F32.9 MAJOR DEPRESSIVE DISORDER, SINGLE EPISODE, UNSPECIFIED: Chronic | Status: ACTIVE | Noted: 2023-03-20

## 2023-03-28 DIAGNOSIS — F32.9 MAJOR DEPRESSIVE DISORDER, SINGLE EPISODE, UNSPECIFIED: ICD-10-CM

## 2023-03-28 DIAGNOSIS — E78.5 HYPERLIPIDEMIA, UNSPECIFIED: ICD-10-CM

## 2023-03-28 DIAGNOSIS — K57.20 DIVERTICULITIS OF LARGE INTESTINE WITH PERFORATION AND ABSCESS WITHOUT BLEEDING: ICD-10-CM

## 2023-03-28 DIAGNOSIS — Z87.81 PERSONAL HISTORY OF (HEALED) TRAUMATIC FRACTURE: ICD-10-CM

## 2023-03-28 DIAGNOSIS — Z79.899 OTHER LONG TERM (CURRENT) DRUG THERAPY: ICD-10-CM

## 2023-03-29 ENCOUNTER — APPOINTMENT (OUTPATIENT)
Dept: COLORECTAL SURGERY | Facility: CLINIC | Age: 43
End: 2023-03-29
Payer: COMMERCIAL

## 2023-03-29 VITALS
DIASTOLIC BLOOD PRESSURE: 72 MMHG | HEART RATE: 70 BPM | OXYGEN SATURATION: 99 % | BODY MASS INDEX: 26.78 KG/M2 | SYSTOLIC BLOOD PRESSURE: 128 MMHG | RESPIRATION RATE: 14 BRPM | WEIGHT: 180.78 LBS | HEIGHT: 68.9 IN

## 2023-03-29 DIAGNOSIS — Z86.59 PERSONAL HISTORY OF OTHER MENTAL AND BEHAVIORAL DISORDERS: ICD-10-CM

## 2023-03-29 PROBLEM — Z00.00 ENCOUNTER FOR PREVENTIVE HEALTH EXAMINATION: Status: ACTIVE | Noted: 2023-03-29

## 2023-03-29 PROCEDURE — 99203 OFFICE O/P NEW LOW 30 MIN: CPT

## 2023-03-29 RX ORDER — BUPROPION HYDROCHLORIDE 75 MG/1
TABLET, FILM COATED ORAL
Refills: 0 | Status: ACTIVE | COMMUNITY

## 2023-03-29 NOTE — CONSULT LETTER
[Dear  ___] : Dear  [unfilled], [Courtesy Letter:] : I had the pleasure of seeing your patient, [unfilled], in my office today. [Please see my note below.] : Please see my note below. [Consult Closing:] : Thank you very much for allowing me to participate in the care of this patient.  If you have any questions, please do not hesitate to contact me. [Sincerely,] : Sincerely, [FreeTextEntry1] : He was recently hospitalized last week with complicated diverticulitis with microperforation and fortunately responded well to nonoperative management. Over the net several weeks I will be transitioning him from a low fiber to a high fiber diet as tolerated. Ultimately I plan on performing an interval colonoscopy 6-8 weeks out from his diverticular attack. Presuming that this truly was diverticular in etiology we discussed keeping him on a high fiber regimen in the long term and if he were to develop recurrent debilitating disease then we would consider discussing a minimally invasive sigmoid colectomy. [FreeTextEntry3] : Avila Almodovar M.D.\par [facility]\par Colon and Rectal Surgery\par [address] [place] [address] Ave[address]par [address]\[place] [address]\par Tel [phone]\par Fax [phone]\par [email or web address]

## 2023-03-29 NOTE — PHYSICAL EXAM
[Exam Deferred] : exam was deferred [Normal Breath Sounds] : Normal breath sounds [Normal Heart Sounds] : normal heart sounds [No Rash or Lesion] : No rash or lesion [Alert] : alert [Calm] : calm [JVD] : no jugular venous distention  [de-identified] : Abd is soft, nontender, nondistended. [de-identified] : Well appearing male in NAD [de-identified] : MMM [de-identified] : ROM WNL

## 2023-03-29 NOTE — HISTORY OF PRESENT ILLNESS
[FreeTextEntry1] : 42M with a hx of depression recently treated nonoperatively at [facility] for complicated sigmoid diverticulitis with microperforation. He progressed well in the hospital and ultimately was discharged home to complete a course of oral antibiotics of which he still has one additional day to complete. Overall he reports feeling well, denies any recurrent pain, fevers, chills, nausea, bloating, diarrhea. Having soft nonbloody BMs daily, and tolerating a low fiber diet thus far. \par \par PMH: Depression\par NKDA\par Meds: Buproprion, Gabapentin\par PSH: Denies\par FH: Denies hx of CRC/IBD\par Cscope: Never

## 2023-04-17 ENCOUNTER — APPOINTMENT (OUTPATIENT)
Dept: COLORECTAL SURGERY | Facility: CLINIC | Age: 43
End: 2023-04-17
Payer: COMMERCIAL

## 2023-04-17 VITALS
SYSTOLIC BLOOD PRESSURE: 116 MMHG | RESPIRATION RATE: 15 BRPM | OXYGEN SATURATION: 97 % | HEART RATE: 84 BPM | DIASTOLIC BLOOD PRESSURE: 76 MMHG

## 2023-04-17 DIAGNOSIS — K57.32 DIVERTICULITIS OF LARGE INTESTINE W/OUT PERFORATION OR ABSCESS W/OUT BLEEDING: ICD-10-CM

## 2023-04-17 PROCEDURE — 99212 OFFICE O/P EST SF 10 MIN: CPT

## 2023-04-17 NOTE — HISTORY OF PRESENT ILLNESS
[FreeTextEntry1] : 42M with recent episode of microperforated diverticulitis requiring hospital admission and IV abx, last seen in the office nearly 3 weeks prior, and now presenting for interval follow up. Pt reports completing abx and denies any recurrence of cramping, fevers, or chills since completion. Tolerating a low fiber diet with Metamucil daily without any bloating, nausea, or diarrhea. Having daily formed nonbloody BMs.

## 2023-04-17 NOTE — PHYSICAL EXAM
[JVD] : no jugular venous distention  [Normal Breath Sounds] : Normal breath sounds [Normal Heart Sounds] : normal heart sounds [No Rash or Lesion] : No rash or lesion [Alert] : alert [Calm] : calm [de-identified] : Abd is soft, nontender, nondistended.  [de-identified] : Well appearing male in NAD [de-identified] : MMM [de-identified] : ROM WNL